# Patient Record
Sex: MALE | Race: WHITE | NOT HISPANIC OR LATINO | URBAN - METROPOLITAN AREA
[De-identification: names, ages, dates, MRNs, and addresses within clinical notes are randomized per-mention and may not be internally consistent; named-entity substitution may affect disease eponyms.]

---

## 2017-04-18 ENCOUNTER — OUTPATIENT (OUTPATIENT)
Dept: OUTPATIENT SERVICES | Facility: HOSPITAL | Age: 57
LOS: 1 days | Discharge: HOME | End: 2017-04-18

## 2017-06-27 DIAGNOSIS — E78.5 HYPERLIPIDEMIA, UNSPECIFIED: ICD-10-CM

## 2017-06-27 DIAGNOSIS — N39.0 URINARY TRACT INFECTION, SITE NOT SPECIFIED: ICD-10-CM

## 2017-06-27 DIAGNOSIS — I25.10 ATHEROSCLEROTIC HEART DISEASE OF NATIVE CORONARY ARTERY WITHOUT ANGINA PECTORIS: ICD-10-CM

## 2017-06-27 DIAGNOSIS — D64.9 ANEMIA, UNSPECIFIED: ICD-10-CM

## 2017-06-27 DIAGNOSIS — E11.8 TYPE 2 DIABETES MELLITUS WITH UNSPECIFIED COMPLICATIONS: ICD-10-CM

## 2017-06-27 DIAGNOSIS — E03.9 HYPOTHYROIDISM, UNSPECIFIED: ICD-10-CM

## 2017-06-27 DIAGNOSIS — R97.20 ELEVATED PROSTATE SPECIFIC ANTIGEN [PSA]: ICD-10-CM

## 2018-06-12 ENCOUNTER — OUTPATIENT (OUTPATIENT)
Dept: OUTPATIENT SERVICES | Facility: HOSPITAL | Age: 58
LOS: 1 days | Discharge: HOME | End: 2018-06-12

## 2018-06-12 DIAGNOSIS — R73.09 OTHER ABNORMAL GLUCOSE: ICD-10-CM

## 2018-06-12 DIAGNOSIS — I10 ESSENTIAL (PRIMARY) HYPERTENSION: ICD-10-CM

## 2018-06-12 DIAGNOSIS — E78.2 MIXED HYPERLIPIDEMIA: ICD-10-CM

## 2019-10-09 ENCOUNTER — OUTPATIENT (OUTPATIENT)
Dept: OUTPATIENT SERVICES | Facility: HOSPITAL | Age: 59
LOS: 1 days | Discharge: HOME | End: 2019-10-09

## 2019-10-09 DIAGNOSIS — E11.8 TYPE 2 DIABETES MELLITUS WITH UNSPECIFIED COMPLICATIONS: ICD-10-CM

## 2019-10-09 DIAGNOSIS — E03.9 HYPOTHYROIDISM, UNSPECIFIED: ICD-10-CM

## 2019-10-09 DIAGNOSIS — E78.5 HYPERLIPIDEMIA, UNSPECIFIED: ICD-10-CM

## 2019-10-09 DIAGNOSIS — N39.0 URINARY TRACT INFECTION, SITE NOT SPECIFIED: ICD-10-CM

## 2019-10-09 DIAGNOSIS — I25.10 ATHEROSCLEROTIC HEART DISEASE OF NATIVE CORONARY ARTERY WITHOUT ANGINA PECTORIS: ICD-10-CM

## 2019-10-09 DIAGNOSIS — D64.9 ANEMIA, UNSPECIFIED: ICD-10-CM

## 2021-08-28 ENCOUNTER — TRANSCRIPTION ENCOUNTER (OUTPATIENT)
Age: 61
End: 2021-08-28

## 2024-01-31 ENCOUNTER — APPOINTMENT (OUTPATIENT)
Dept: OTOLARYNGOLOGY | Facility: CLINIC | Age: 64
End: 2024-01-31
Payer: COMMERCIAL

## 2024-01-31 DIAGNOSIS — H92.02 OTALGIA, LEFT EAR: ICD-10-CM

## 2024-01-31 DIAGNOSIS — H93.8X2 OTHER SPECIFIED DISORDERS OF LEFT EAR: ICD-10-CM

## 2024-01-31 DIAGNOSIS — H61.23 IMPACTED CERUMEN, BILATERAL: ICD-10-CM

## 2024-01-31 PROBLEM — Z00.00 ENCOUNTER FOR PREVENTIVE HEALTH EXAMINATION: Status: ACTIVE | Noted: 2024-01-31

## 2024-01-31 PROCEDURE — 99203 OFFICE O/P NEW LOW 30 MIN: CPT | Mod: 25

## 2024-01-31 PROCEDURE — 69210 REMOVE IMPACTED EAR WAX UNI: CPT

## 2024-01-31 RX ORDER — PENICILLIN V POTASSIUM 500 MG/1
500 TABLET, FILM COATED ORAL
Refills: 0 | Status: ACTIVE | COMMUNITY

## 2024-01-31 NOTE — HISTORY OF PRESENT ILLNESS
[de-identified] : Patient presents today for c/o ear pain. He states since flying 3 weeks ago he has since has L ear pain, pressure, clogged sensation. Reports slight ringing in R ear. He is otherwise doing well with no ent complaints. He has a known history of wax build up. Would like to schedule hearing test.

## 2024-01-31 NOTE — PHYSICAL EXAM
[Normal] : mucosa is normal [Midline] : trachea located in midline position [de-identified] : B/L cerumen impaction removed with curette, type A tymps

## 2025-04-10 ENCOUNTER — EMERGENCY (EMERGENCY)
Facility: HOSPITAL | Age: 65
LOS: 0 days | Discharge: HOME | End: 2025-04-10
Attending: STUDENT IN AN ORGANIZED HEALTH CARE EDUCATION/TRAINING PROGRAM
Payer: COMMERCIAL

## 2025-04-10 ENCOUNTER — NON-APPOINTMENT (OUTPATIENT)
Age: 65
End: 2025-04-10

## 2025-04-10 VITALS
HEART RATE: 71 BPM | DIASTOLIC BLOOD PRESSURE: 87 MMHG | SYSTOLIC BLOOD PRESSURE: 166 MMHG | OXYGEN SATURATION: 100 % | RESPIRATION RATE: 20 BRPM

## 2025-04-10 VITALS
TEMPERATURE: 98 F | SYSTOLIC BLOOD PRESSURE: 162 MMHG | OXYGEN SATURATION: 99 % | RESPIRATION RATE: 17 BRPM | HEART RATE: 65 BPM | DIASTOLIC BLOOD PRESSURE: 95 MMHG

## 2025-04-10 PROCEDURE — 99285 EMERGENCY DEPT VISIT HI MDM: CPT | Mod: 25

## 2025-04-10 PROCEDURE — 76882 US LMTD JT/FCL EVL NVASC XTR: CPT | Mod: RT

## 2025-04-10 PROCEDURE — 36000 PLACE NEEDLE IN VEIN: CPT | Mod: XU

## 2025-04-10 PROCEDURE — 99291 CRITICAL CARE FIRST HOUR: CPT | Mod: 25

## 2025-04-10 PROCEDURE — 76882 US LMTD JT/FCL EVL NVASC XTR: CPT | Mod: 26,RT,59

## 2025-04-10 PROCEDURE — 12002 RPR S/N/AX/GEN/TRNK2.6-7.5CM: CPT

## 2025-04-10 PROCEDURE — 99285 EMERGENCY DEPT VISIT HI MDM: CPT

## 2025-04-10 PROCEDURE — 73130 X-RAY EXAM OF HAND: CPT | Mod: 26,RT

## 2025-04-10 PROCEDURE — 82962 GLUCOSE BLOOD TEST: CPT

## 2025-04-10 PROCEDURE — 64450 NJX AA&/STRD OTHER PN/BRANCH: CPT | Mod: RT

## 2025-04-10 PROCEDURE — 73130 X-RAY EXAM OF HAND: CPT | Mod: RT

## 2025-04-10 RX ORDER — AMOXICILLIN AND CLAVULANATE POTASSIUM 500; 125 MG/1; MG/1
1 TABLET, FILM COATED ORAL
Refills: 0
Start: 2025-04-10

## 2025-04-10 RX ORDER — DOXYCYCLINE HYCLATE 100 MG
1 TABLET ORAL
Qty: 14 | Refills: 0
Start: 2025-04-10 | End: 2025-04-16

## 2025-04-10 RX ORDER — CLINDAMYCIN PHOSPHATE 150 MG/ML
1 VIAL (ML) INJECTION
Refills: 0
Start: 2025-04-10

## 2025-04-10 NOTE — ED ADULT TRIAGE NOTE - CHIEF COMPLAINT QUOTE
Pt here with R hand laceration after was corking bottle and glass shattered. [place] [facility] [place]. R hand with dry dressing on .

## 2025-04-10 NOTE — ED PROVIDER NOTE - NSFOLLOWUPINSTRUCTIONS_ED_ALL_ED_FT
Pt complaining of anxiety Our Emergency Department Referral Coordinators will be reaching out to you in the next 24-48 hours from 9:00am to 5:00pm to schedule a follow up appointment. Please expect a phone call from the hospital in that time frame. If you do not receive a call or if you have any questions or concerns, you can reach them at   [phone].     Laceration    A laceration is a cut that goes through all of the layers of the skin and into the tissue that is right under the skin. Some lacerations heal on their own. Others need to be closed with stitches (sutures), staples, skin adhesive strips, or skin glue. Proper laceration care minimizes the risk of infection and helps the laceration to heal better.     SEEK IMMEDIATE MEDICAL CARE IF YOU HAVE THE FOLLOWING SYMPTOMS: swelling around the wound, worsening pain, drainage from the wound, red streaking going away from your wound, inability to move finger or toe near the laceration, or discoloration of skin near the laceration.

## 2025-04-10 NOTE — ED ADULT NURSE NOTE - CHIEF COMPLAINT QUOTE
Pt here with R hand laceration after was corking bottle and glass shattered. went to [facility] [place] received tdap today. R hand with dry dressing on .

## 2025-04-10 NOTE — ED ADULT TRIAGE NOTE - AVIAN FLU SYMPTOMS
Peripheral Block    Performed by: Ranjeet Law MD  Authorized by: Ranjeet Law MD       Pre-procedure: Indications: at surgeon's request and post-op pain management    Preanesthetic Checklist: patient identified, risks and benefits discussed, site marked, timeout performed, anesthesia consent given and patient being monitored      Block Type:   Block Type:   Adductor canal  Laterality:  Right  Monitoring:  Standard ASA monitoring, continuous pulse ox, frequent vital sign checks, heart rate, responsive to questions and oxygen  Injection Technique:  Single shot  Procedures: ultrasound guided    Patient Position: supine  Prep: chlorhexidine    Location:  Mid thigh  Needle Type:  Stimuplex  Needle Gauge:  21 G  Needle Localization:  Anatomical landmarks and ultrasound guidance  Medication Injected:  Ropivacaine (PF) (NAROPIN)(0.5%) 5 mg/mL injection - Peripheral Nerve Block   20 mL - 3/15/2023 7:25:00 AM  Med Admin Time: 3/15/2023 7:25 AM    Assessment:  Number of attempts:  1  Injection Assessment:  Incremental injection every 5 mL, local visualized surrounding nerve on ultrasound, negative aspiration for blood, no paresthesia and no intravascular symptoms  Patient tolerance:  Patient tolerated the procedure well with no immediate complications No

## 2025-04-10 NOTE — CONSULT NOTE ADULT - ASSESSMENT
A: 64yM RHD w/ PMHx of HTN, HLD seen as a code trauma s/p glass injury to right hand. Patient was seen in critical care, received median nerve block before lac being repaired to palm of hand and when asked to flex digits was said to have pulsatile bleeding, prompting escalation to code trauma status.   Trauma assessment in ED: ABCs intact , GCS 15 , AAOx3.    Patient evaluated and appeared hemostatic at time of eval. At time of eval, sensory/motor exam appear intact, but discussed with patient that exam can be limited given previous block so will need to follow up with Dr Juan. Verbal consent obtained for laceration repair. Local block given with 5cc of 1% lidocaine. Wound washed out with betadine/saline solution and repaired with 3-0 chromics. Patient hemostatic and wound dressed in bulky dressing at end of evaluation.    Plan  -Discharge okay on 1 week of abx  -Laceration repaired  -Please follow up with Dr. Sheldon Juan in 1 week  -Keep hand elevated when resting  -Keep hand dry until follow up    Patient/plan discussed with Dr. Juan    [facility]  Plastic Surgery  #[phone] [facility] pager  [phone] [facility] pager  Available on teams

## 2025-04-10 NOTE — ED PROVIDER NOTE - PROGRESS NOTE DETAILS
sh  pt had median nerve block to right hand due to multiple lacerations prior to laceration repair. procedure went well without complications. While irrigating the hand wound with normal saline pt experienced active pulsatile bleeding from open laceration. Pressure was applied and pt upgraded to critical care area of ED for concern of arterial bleed JOE: Patient noted to have pulsatile bleeding by ED team and was upgraded to main critical care as code trauma for this.  Patient states around 10 AM he was mixing cocktails and went to put a cork back on the bottle when the bottle broke and lacerated his hand.  Was seen at [facility] [place] where he was given a Tdap shot and advised ED evaluation.  Here patient was seen by Dr. Solis, had x-ray done that showed no foreign body, median nerve block performed.  On wound exploration his team noted pulsatile bleeding.  Code trauma was called and patient was upgraded here.      ABCs are intact, vital signs are stable.  2 cm deep laceration noted to the distal aspect of the right thenar eminence with subcutaneous fat exposed.  No active pulsatile bleeding noted on my exam. Patient reports no other complaints of pain or injury at this time.  Has numbness to his hand now that he had the nerve block performed in distribution of the median nerve.  Has full range of motion of the shoulder, elbow and wrist without difficulty.  Full range of motion of all fingers and can flex at all joints without difficulty.      Trauma team at bedside. Wound explored in bloodless field without foreign body or active bleeding noted. Dressing applied, awaiting hand surgery evaluation CO- pt upgraded to critical care area when pt developed pulsatile bleed from deep lac and trauma code was called for acute arterial bleed.  pulsatile bleed did not occur during deep exploration. while assessing would, pt was told to range thumb to assess for visible tendon when pulsatile bleeding began.  bleeding managed w/ direct pressure en route to critical care area. care transferred to critical care attending  Dr. Strauss when pt was upgraded

## 2025-04-10 NOTE — ED PROVIDER NOTE - PHYSICAL EXAMINATION
VITAL SIGNS: noted  CONSTITUTIONAL: Well-developed; well-nourished; in no acute distress  HEAD: Normocephalic; atraumatic  EYES: PERRL, EOM intact; conjunctiva and sclera clear  ENT: No nasal discharge;, MMM,   NECK: Supple; non tender. No anterior cervical lymphadenopathy noted  CARD: S1, S2 normal; no murmurs, gallops, or rubs. Regular rate and rhythm  RESP: CTAB/L, no wheezes, rales or rhonchi  EXT: RUE focused: FROM distal puleses intact. NVI. capillary refill <2sec   NEURO: Awake and alert, oriented. Grossly unremarkable. No focal deficits.  SKIN: Skin exam is warm and dry, + multiple lacerations to palmar side of right hand. + ~3cm laceration to thenar eminence with subcutaneous fat noticed. no tendon involvement + ttp over area no foreign body noted. bleeding controlled.

## 2025-04-10 NOTE — ED ADULT NURSE NOTE - PAIN: ALLEVIATING FACTORS
wound care and bleeding control/repositioning/distraction/environment adjustment/immobilization/relaxation/rest/elevation/deep breathing

## 2025-04-10 NOTE — CONSULT NOTE ADULT - SUBJECTIVE AND OBJECTIVE BOX
TRAUMA ACTIVATION LEVEL:  CODE  ACTIVATED BY: ED  INTUBATED: NO      MECHANISM OF INJURY:   [] Blunt     [] MVC	  [] Fall	  [] Pedestrian Struck	  [] Motorcycle     [] Assault     [] Bicycle collision    [] Sports injury    [] Penetrating  -- glass injury to right hand   [] Gun Shot Wound      [] Stab Wound    GCS: 15 	E: 4	V: 5	M: 6    HPI:    64yM w/ PMHx of HTN, HLD seen as a code trauma s/p glass injury to right hand. Patient was seen in critical care, received median nerve block before lac being repaired to palm of hand and when asked to flex digits was said to have pulsatile bleeding, prompting escalation to code trauma status.   Trauma assessment in ED: ABCs intact , GCS 15 , AAOx3.    PAST MEDICAL & SURGICAL HISTORY:      Allergies    penicillins (Unknown)    Intolerances        Home Medications:      ROS: 10-system review is otherwise negative except HPI above.      Primary Survey:    A - airway intact  B - bilateral breath sounds and good chest rise  C - palpable pulses in all extremities  D - GCS 15 on arrival, BARBOUR  Exposure obtained    Vital Signs Last 24 Hrs  T(C): 36.5 (10 Apr 2025 15:06), Max: 36.7 (10 Apr 2025 11:46)  T(F): 97.7 (10 Apr 2025 15:06), Max: 98 (10 Apr 2025 11:46)  HR: 71 (10 Apr 2025 15:15) (65 - 71)  BP: 166/87 (10 Apr 2025 15:15) (150/99 - 166/87)  BP(mean): --  RR: 20 (10 Apr 2025 15:15) (17 - 20)  SpO2: 100% (10 Apr 2025 15:15) (99% - 100%)    Parameters below as of 10 Apr 2025 15:15  Patient On (Oxygen Delivery Method): room air        Secondary Survey:   General: NAD  HEENT: Normocephalic, atraumatic, EOMI, PEERLA. no scalp lacerations   Neck: Soft, midline trachea. no c-spine tenderness  Chest: No chest wall tenderness, no subcutaneous emphysema   Cardiac: S1, S2, RRR  Respiratory: Bilateral breath sounds, clear and equal bilaterally  Abdomen: Soft, non-distended, non-tender, no rebound, no guarding.  Groin: Normal appearing, pelvis stable   Ext:  Moving b/l upper and lower extremities. Palpable Radial b/l UE, b/l DP palpable in LE. 2cm laceration to palm and additional laceration 1cm on palm closer to thumb with no active pulsatile bleeding. Mild numbness in fingers 2/2 median nerve block. otherwise able to flex digits at PIP and DIP joints. No obvious foreign body in the lacerations.  Back: No T/L/S spine tenderness, No palpable runoff/stepoff/deformity  Rectal: Normal tone      ACCESS / DEVICES:  [ x ] Peripheral IV  [ ] Central Venous Line	[ ] R	[ ] L	[ ] IJ	[ ] Fem	[ ] SC	Placed:   [ ] Arterial Line		[ ] R	[ ] L	[ ] Fem	[ ] Rad	[ ] Ax	Placed:   [ ] PICC:					[ ] Mediport  [ ] Urinary Catheter,  Date Placed:   [ ] Chest tube: [ ] Right, [ ] Left  [ ] СВЕТЛАНА/Martin Drains    Labs:  CAPILLARY BLOOD GLUCOSE      POCT Blood Glucose.: 106 mg/dL (10 Apr 2025 15:10)                  LFTs:         Coags:                        RADIOLOGY & ADDITIONAL STUDIES:    < from: Xray Hand 3 Views, Right (04.10.25 @ 12:08) >    INTERPRETATION:  Clinical indication/reason for exam: Trauma.    PA, lateral and oblique views of the right hand were performed and   submitted for evaluation. No old films are available for comparison.    No acute fractures or dislocations are seen.    There is no evidence of significant degenerative disease.    The bone mineral density is normal.    Impression:    No evidence of an acute fracture.    --- End of Report ---    < end of copied text >    ---------------------------------------------------------------------------------------    ASSESSMENT:  64y Male  w/ PMHx of HTN and HLD seen as a code trauma post cutting hand on glass and seen with questionable pulsatile bleeding in urgent care of ED. Trauma assessment in ED: ABCs intact , GCS 15 , AAOx3,  BARBOUR.     Injuries identified:   - two palm lacerations, 2cm and 1cm  - no obvious pulsatile bleeding or ligamentous injury    Plan  - hand/plastic surgery consult to rule out ligamentous, arterial, and nervous injury in hand  - laceration repair in ED by plastic surgery  - no interventions from trauma surgery    Discussed with trauma surgery attending Dr. Michael Ramírez MD  PGY2 Trauma Surgery  --------------------------------------------------------------------------------------  04-10-25 @ 15:30    TRAUMA SENIOR SPECTRA: [id number]  TRAUMA TEAM SPECTRA: [id number] TRAUMA ACTIVATION LEVEL:  CODE  ACTIVATED BY: ED  INTUBATED: NO      MECHANISM OF INJURY:   [] Blunt     [] MVC	  [] Fall	  [] Pedestrian Struck	  [] Motorcycle     [] Assault     [] Bicycle collision    [] Sports injury    [] Penetrating  -- glass injury to right hand   [] Gun Shot Wound      [] Stab Wound    GCS: 15 	E: 4	V: 5	M: 6    HPI:    64yM w/ PMHx of HTN, HLD seen as a code trauma s/p glass injury to right hand. Patient was seen in critical care, received median nerve block before lac being repaired to palm of hand and when asked to flex digits was said to have pulsatile bleeding, prompting escalation to code trauma status.   Trauma assessment in ED: ABCs intact , GCS 15 , AAOx3.    PAST MEDICAL & SURGICAL HISTORY:      Allergies    penicillins (Unknown)    Intolerances        Home Medications:      ROS: 10-system review is otherwise negative except HPI above.      Primary Survey:    A - airway intact  B - bilateral breath sounds and good chest rise  C - palpable pulses in all extremities  D - GCS 15 on arrival, BARBOUR  Exposure obtained    Vital Signs Last 24 Hrs  T(C): 36.5 (10 Apr 2025 15:06), Max: 36.7 (10 Apr 2025 11:46)  T(F): 97.7 (10 Apr 2025 15:06), Max: 98 (10 Apr 2025 11:46)  HR: 71 (10 Apr 2025 15:15) (65 - 71)  BP: 166/87 (10 Apr 2025 15:15) (150/99 - 166/87)  BP(mean): --  RR: 20 (10 Apr 2025 15:15) (17 - 20)  SpO2: 100% (10 Apr 2025 15:15) (99% - 100%)    Parameters below as of 10 Apr 2025 15:15  Patient On (Oxygen Delivery Method): room air        Secondary Survey:   General: NAD  HEENT: Normocephalic, atraumatic, EOMI, PEERLA. no scalp lacerations   Neck: Soft, midline trachea. no c-spine tenderness  Chest: No chest wall tenderness, no subcutaneous emphysema   Cardiac: S1, S2, RRR  Respiratory: Bilateral breath sounds, clear and equal bilaterally  Abdomen: Soft, non-distended, non-tender, no rebound, no guarding.  Groin: Normal appearing, pelvis stable   Ext:  Moving b/l upper and lower extremities. Palpable Radial b/l UE, b/l DP palpable in LE. 2cm laceration to palm and additional laceration 1cm on palm closer to thumb with no active pulsatile bleeding. Mild numbness in fingers 2/2 median nerve block. otherwise able to flex digits at PIP and DIP joints. No obvious foreign body in the lacerations.  Back: No T/L/S spine tenderness, No palpable runoff/stepoff/deformity      ACCESS / DEVICES:  [ x ] Peripheral IV  [ ] Central Venous Line	[ ] R	[ ] L	[ ] IJ	[ ] Fem	[ ] SC	Placed:   [ ] Arterial Line		[ ] R	[ ] L	[ ] Fem	[ ] Rad	[ ] Ax	Placed:   [ ] PICC:					[ ] Mediport  [ ] Urinary Catheter,  Date Placed:   [ ] Chest tube: [ ] Right, [ ] Left  [ ] СВЕТЛАНА/Martin Drains    Labs:  CAPILLARY BLOOD GLUCOSE      POCT Blood Glucose.: 106 mg/dL (10 Apr 2025 15:10)                  LFTs:         Coags:                        RADIOLOGY & ADDITIONAL STUDIES:    < from: Xray Hand 3 Views, Right (04.10.25 @ 12:08) >    INTERPRETATION:  Clinical indication/reason for exam: Trauma.    PA, lateral and oblique views of the right hand were performed and   submitted for evaluation. No old films are available for comparison.    No acute fractures or dislocations are seen.    There is no evidence of significant degenerative disease.    The bone mineral density is normal.    Impression:    No evidence of an acute fracture.    --- End of Report ---    < end of copied text >    ---------------------------------------------------------------------------------------    ASSESSMENT:  64y Male  w/ PMHx of HTN and HLD seen as a code trauma post cutting hand on glass and seen with questionable pulsatile bleeding in urgent care of ED. Trauma assessment in ED: ABCs intact , GCS 15 , AAOx3,  BARBOUR.     Injuries identified:   - two palm lacerations, 2cm and 1cm  - no obvious pulsatile bleeding or ligamentous injury    Plan  - hand/plastic surgery consult to rule out ligamentous, arterial, and nervous injury in hand  - laceration repair in ED by plastic surgery  - no interventions from trauma surgery    Discussed with trauma surgery attending Dr. Michael Ramírez MD  PGY2 Trauma Surgery  --------------------------------------------------------------------------------------  04-10-25 @ 15:30    TRAUMA SENIOR SPECTRA: [id number]  TRAUMA TEAM SPECTRA: [id number]

## 2025-04-10 NOTE — CONSULT NOTE ADULT - ATTENDING COMMENTS
Trauma Attending Note Attestation  Patient was examined and evaluated at the bedside at 3:12 PM. Medications, radiological studies and all other relevant studies reviewed.     History as above. Patient just reports some discomfort in R hand. Numb after nerve block.    Vital Signs Last 24 Hrs  T(C): 36.5 (10 Apr 2025 15:06), Max: 36.7 (10 Apr 2025 11:46)  T(F): 97.7 (10 Apr 2025 15:06), Max: 98 (10 Apr 2025 11:46)  HR: 71 (10 Apr 2025 15:15) (65 - 71)  BP: 166/87 (10 Apr 2025 15:15) (150/99 - 166/87)  BP(mean): --  RR: 20 (10 Apr 2025 15:15) (17 - 20)  SpO2: 100% (10 Apr 2025 15:15) (99% - 100%)    Parameters below as of 10 Apr 2025 15:15  Patient On (Oxygen Delivery Method): room air    Primary:  Airway - intact  Breathing - breath sounds bilaterally  Circulation - 2+ throughout  Disability - GCS 15, moving all extremities though limited by nerve block in R hand  Exposure - patient was exposed    I independently performed a medically appropriate exam. I have made revisions to the physical exam in the note above and agree with the exam as written.    R hand XR reviewed and interpreted by me: no acute fracture or dislocation    Assessment/Plan:  64y Male PMH HTN, NLD s/p penetrating injury right hand with two palm lacerations. Upgraded to trauma due to concern for arterial bleeding. No evidence of arterial bleeding at the time of my physical exam. Neurovascularly intact when taking nerve block into account. No acute fractures or dislocations. Recommend hand surgery consult to evaluate for structural damage to right hand.    Korey Rodriguez MD  Trauma/Acute Care Surgery/Surgical Critical Care Attending

## 2025-04-10 NOTE — ED PROVIDER NOTE - DIFFERENTIAL DIAGNOSIS
Differential Diagnosis The differential diagnosis for patients clinical presentation includes but is not limited to: laceration, abrasion

## 2025-04-10 NOTE — ED PROVIDER NOTE - CARE PLAN
1 Principal Discharge DX:	Hand laceration   Principal Discharge DX:	Hand laceration  Assessment and plan of treatment:	plan - xr lac repair

## 2025-04-10 NOTE — CONSULT NOTE ADULT - SUBJECTIVE AND OBJECTIVE BOX
HAND SURGERY CONSULT NOTE    Patient is a 64y old  Male who presents with a chief complaint of     HPI:  64yM w/ PMHx of HTN, HLD seen as a code trauma s/p glass injury to right hand. Patient was seen in critical care, received median nerve block before lac being repaired to palm of hand and when asked to flex digits was said to have pulsatile bleeding, prompting escalation to code trauma status.   Trauma assessment in ED: ABCs intact , GCS 15 , AAOx3.    Hand consulted for R hand laceration. History as above. Briefly, RHD man who cut hand while opening bottle. Question of arterial bleeder on exam so hand consult called. At time of evaluation, patient hand was hemostatic. Patient reports he received median nerve block at OSH prior to evaluation.    PAST MEDICAL & SURGICAL HISTORY:    [  ] No significant past history as reviewed with the patient and family    FAMILY HISTORY:    [  ] Family history not pertinent as reviewed with the patient and family    SOCIAL HISTORY:    MEDICATIONS  (STANDING):    MEDICATIONS  (PRN):    Allergies    penicillins (Unknown)    Intolerances        Vital Signs Last 24 Hrs  T(C): 36.5 (10 Apr 2025 15:06), Max: 36.7 (10 Apr 2025 11:46)  T(F): 97.7 (10 Apr 2025 15:06), Max: 98 (10 Apr 2025 11:46)  HR: 71 (10 Apr 2025 15:15) (65 - 71)  BP: 166/87 (10 Apr 2025 15:15) (150/99 - 166/87)  BP(mean): --  RR: 20 (10 Apr 2025 15:15) (17 - 20)  SpO2: 100% (10 Apr 2025 15:15) (99% - 100%)    Parameters below as of 10 Apr 2025 15:15  Patient On (Oxygen Delivery Method): room air      Daily Height in cm: 182.88 (10 Apr 2025 15:06)    Daily       EXAM:  Gen:  IN NAD  Hand:   R hand 2cm laceration about 1cm proximal to Davalos's line. No obvious bleeding at time of evaluation. Able to actively extend/flex all 5 digits at MCP, PIP, DIP. Given previous nerve block, sensory exam is very limited. However, it appears that ulnar/radial/median nerve distribution SILT.         IMAGING STUDIES:

## 2025-04-10 NOTE — ED PROVIDER NOTE - OBJECTIVE STATEMENT
64 y.o right hand dominant Male with PMHx of HTN HLD presenting to the ED with chief complaint of laceration to right hand sustained earlier today when attempting to place cork screw back onto a wine bottle. Pt states he applied too much pressure and the glass broke cutting the palmar side of his right hand. Pt immediately applied pressure to control bleeding went to  and was given tetanus vaccine and advised to come to ED for further evaluation. Pt complaining of distal right thumb parathesias. Denies any other injuries.

## 2025-04-10 NOTE — ED PROVIDER NOTE - CLINICAL SUMMARY MEDICAL DECISION MAKING FREE TEXT BOX
63 yo M presented to ED with right hand laceration. Imaging was ordered and reviewed by me.  No fracture.  Patient's records (prior hospital, ED visit, and/or nursing home notes if available) were reviewed.  Additional history was obtained from EMS, family, and/or PCP (where available).  Escalation to admission/observation was considered. Concern for possible arterial bleeding during exploration of wound, patient upgraded to critical care evaluated by trauma and hand surgery. Laceration repaired by hand surgery. Patient feels much better and is comfortable with discharge.  Appropriate follow-up was arranged. Return precautions discussed in detail.

## 2025-04-10 NOTE — ED PROVIDER NOTE - ATTENDING CONTRIBUTION TO CARE
64 y.o right hand dominant Male with PMHx of HTN HLD presenting to the ED with chief complaint of laceration to right hand sustained earlier today when attempting to place cork on a bottle. pt states bottle broke under pressure and he cut the palmar side of R hand. pt applied pressure, went to  and came to ED. bleeding improved upon ariving to ED.  in ed, pt w/ lacs to thenar eminence and small lac mid prox palm.  glass was clean. pt not on ac or anitplt    vss  gen- NAD, aaox3  card-rrr  lungs-ctab, no wheezing or rhonchi  neuro- UE/LE full str/sensation, cn ii-xii grossly intact, normal coordination and gait  R hand- deep laceration to thenar region ~3cm with exposed fat and muscle, no visible tendon, superficial  ~1.5 cm lac to prox palm, abrasion to pinky, mild decrease in sensation to distal thumb pad, full ROM to all fingers to flex/ext MCP, PIP/DIP

## 2025-04-10 NOTE — ED PROVIDER NOTE - CARE PROVIDER_API CALL
Sheldon Juan  Plastic Surgery  [address] [place] [address], [address]  [place] [address]  Phone: [phone]  Fax: [phone]  Follow Up Time: 7-10 Days

## 2025-04-10 NOTE — ED ADULT NURSE NOTE - CAS DISCH CONDITION
Detail Level: Detailed
Detail Level: Zone
Sunscreen Recommendations: Sunscreen with a minimum of 30 SPF, zinc or titanium such as [employer]rogen[employer] or [employer].
Detail Level: Generalized
Stable

## 2025-04-10 NOTE — ED ADULT NURSE NOTE - NSFALLUNIVINTERV_ED_ALL_ED
Bed/Stretcher in lowest position, wheels locked, appropriate side rails in place/Call bell, personal items and telephone in reach/Instruct patient to call for assistance before getting out of bed/chair/stretcher/Non-slip footwear applied when patient is off stretcher/[place] to call system/Physically safe environment - no spills, clutter or unnecessary equipment/Purposeful proactive rounding/Room/bathroom lighting operational, light cord in reach

## 2025-04-10 NOTE — ED ADULT NURSE NOTE - OBJECTIVE STATEMENT
[place] [facility] [place] hand, pt states he was attempting to cork a wine bottle, the glass broke and patient cut his hand. dressing in place on arrival. no bleeding noted to dressing, no ac use

## 2025-04-11 NOTE — CHART NOTE - NSCHARTNOTEFT_GEN_A_CORE
"[facility] MRN [id number] / Dr Juan / Pt wants an appt, book next available 4/11 - JL / Apointment made - JL / [address] [facility] [address] / 	Thu 04/17/2025 02:00 PM"    SPECIALTY: plastic surgery

## 2025-04-17 ENCOUNTER — APPOINTMENT (OUTPATIENT)
Dept: PLASTIC SURGERY | Facility: CLINIC | Age: 65
End: 2025-04-17
Payer: COMMERCIAL

## 2025-04-17 VITALS — BODY MASS INDEX: 30.61 KG/M2 | HEIGHT: 72 IN | WEIGHT: 226 LBS

## 2025-04-17 DIAGNOSIS — S61.419A LACERATION W/OUT FOREIGN BODY OF UNSPECIFIED HAND, INITIAL ENCOUNTER: ICD-10-CM

## 2025-04-17 DIAGNOSIS — Z78.9 OTHER SPECIFIED HEALTH STATUS: ICD-10-CM

## 2025-04-17 DIAGNOSIS — Z72.3 LACK OF PHYSICAL EXERCISE: ICD-10-CM

## 2025-04-17 PROCEDURE — 99203 OFFICE O/P NEW LOW 30 MIN: CPT

## 2025-04-17 RX ORDER — LOSARTAN POTASSIUM 100 MG/1
TABLET, FILM COATED ORAL
Refills: 0 | Status: ACTIVE | COMMUNITY

## 2025-04-17 RX ORDER — ROSUVASTATIN CALCIUM 5 MG/1
TABLET, FILM COATED ORAL
Refills: 0 | Status: ACTIVE | COMMUNITY

## 2025-04-17 RX ORDER — EZETIMIBE 10 MG/1
TABLET ORAL
Refills: 0 | Status: ACTIVE | COMMUNITY

## 2025-04-17 RX ORDER — AMLODIPINE BESYLATE VALSARTAN HYDROCHLOROTHIAZIDE 10; 25; 320 MG/1; MG/1; MG/1
TABLET, FILM COATED ORAL
Refills: 0 | Status: ACTIVE | COMMUNITY